# Patient Record
Sex: FEMALE | Race: WHITE | Employment: FULL TIME | URBAN - METROPOLITAN AREA
[De-identification: names, ages, dates, MRNs, and addresses within clinical notes are randomized per-mention and may not be internally consistent; named-entity substitution may affect disease eponyms.]

---

## 2018-04-12 ENCOUNTER — HOSPITAL ENCOUNTER (OUTPATIENT)
Dept: PREADMISSION TESTING | Facility: HOSPITAL | Age: 43
Discharge: HOME | End: 2018-04-12
Payer: COMMERCIAL

## 2018-04-12 DIAGNOSIS — M51.36 DDD (DEGENERATIVE DISC DISEASE), LUMBAR: Primary | ICD-10-CM

## 2018-04-18 ENCOUNTER — TELEPHONE (OUTPATIENT)
Dept: ORTHOPEDICS | Facility: CLINIC | Age: 43
End: 2018-04-18

## 2018-04-18 NOTE — TELEPHONE ENCOUNTER
Updated the patient that I am currently waiting on a phone call from [facility] nurse mary to return my call about setting up a peer to peer with [facility] physician and dr verdin. Will keep the patient updated with changes and cancel the sx for 4/24 since we do not have an auth at this time.   ----- Message from Davida Grullon sent at 4/13/2018  1:36 PM CDT -----  Contact: PATIENT  PLEASE CALL PATIENT ABOUT INSURANCE QUESTIONS

## 2018-11-01 ENCOUNTER — OFFICE VISIT (OUTPATIENT)
Dept: ORTHOPEDICS | Facility: CLINIC | Age: 43
End: 2018-11-01
Payer: COMMERCIAL

## 2018-11-01 VITALS
BODY MASS INDEX: 28.82 KG/M2 | WEIGHT: 173 LBS | HEART RATE: 114 BPM | HEIGHT: 65 IN | DIASTOLIC BLOOD PRESSURE: 88 MMHG | SYSTOLIC BLOOD PRESSURE: 135 MMHG

## 2018-11-01 DIAGNOSIS — M65.332 TRIGGER FINGER, LEFT MIDDLE FINGER: Primary | ICD-10-CM

## 2018-11-01 PROCEDURE — 73130 X-RAY EXAM OF HAND: CPT | Mod: LT,,, | Performed by: ORTHOPAEDIC SURGERY

## 2018-11-01 PROCEDURE — 99203 OFFICE O/P NEW LOW 30 MIN: CPT | Mod: 25,,, | Performed by: ORTHOPAEDIC SURGERY

## 2018-11-01 PROCEDURE — 20550 NJX 1 TENDON SHEATH/LIGAMENT: CPT | Mod: F2,,, | Performed by: ORTHOPAEDIC SURGERY

## 2018-11-01 RX ORDER — METHYLPREDNISOLONE ACETATE 40 MG/ML
40 INJECTION, SUSPENSION INTRA-ARTICULAR; INTRALESIONAL; INTRAMUSCULAR; SOFT TISSUE
Status: DISCONTINUED | OUTPATIENT
Start: 2018-11-01 | End: 2018-11-01 | Stop reason: HOSPADM

## 2018-11-01 RX ORDER — PRAVASTATIN SODIUM 20 MG/1
TABLET ORAL
Refills: 4 | COMMUNITY
Start: 2018-08-27

## 2018-11-01 RX ORDER — CITALOPRAM 40 MG/1
TABLET, FILM COATED ORAL
Refills: 5 | COMMUNITY
Start: 2018-09-25 | End: 2020-03-16 | Stop reason: SDUPTHER

## 2018-11-01 RX ADMIN — METHYLPREDNISOLONE ACETATE 40 MG: 40 INJECTION, SUSPENSION INTRA-ARTICULAR; INTRALESIONAL; INTRAMUSCULAR; SOFT TISSUE at 04:11

## 2018-11-01 NOTE — PROCEDURES
Tendon Sheath: L long MCP  Date/Time: 11/1/2018 4:10 PM  Performed by: Vidal Kenney MD  Authorized by: Vidal Kenney MD     Consent Done?: Yes (Verbal)  Timeout: prior to procedure the correct patient, procedure, and site was verified   Indications:  Pain  Site marked: the procedure site was marked    Timeout: prior to procedure the correct patient, procedure, and site was verified    Location:  Long finger  Site:  L long MCP  Prep: patient was prepped and draped in usual sterile fashion    Ultrasonic guidance for needle placement?: No    Needle size:  25 G  Medications:  40 mg methylPREDNISolone acetate 40 mg/mL  Patient tolerance:  Patient tolerated the procedure well with no immediate complications

## 2018-11-01 NOTE — PROGRESS NOTES
[facility]    Subjective:     Chief Complaint:   Chief Complaint   Patient presents with    Left Hand - Pain     Left hand pain off and on for months. States that her hand hurts all the time and states that she has a spot on her palm that sends a shooting pain.        Past Medical History:   Diagnosis Date    Anxiety     Back pain     DDD    Hypertension        Past Surgical History:   Procedure Laterality Date    ADENOIDECTOMY      APPENDECTOMY       SECTION      CHOLECYSTECTOMY      HYSTERECTOMY      INJECTION-STEROID-EPIDURAL-TRANSFORAMINAL Bilateral 2014    Performed by Steven Pradhan MD at [facility]    OOPHORECTOMY      Pt states that she only has one ovary, but does not remember which one.    TONSILLECTOMY         Current Outpatient Medications   Medication Sig    celecoxib (CELEBREX) 100 MG capsule Take 1 capsule (100 mg total) by mouth once daily.    citalopram (CELEXA) 40 MG tablet     fluticasone (FLONASE) 50 mcg/actuation nasal spray 1 spray by Each Nare route once daily. (Patient taking differently: 1 spray by Each Nare route daily as needed. )    omeprazole (PRILOSEC) 20 MG capsule Take 1 capsule (20 mg total) by mouth once daily.    pravastatin (PRAVACHOL) 20 MG tablet     urea (CARMOL) 40 % Crea Apply topically 2 (two) times daily. (Patient taking differently: Apply topically daily as needed. )    baclofen (LIORESAL) 20 MG tablet Take 1 tablet (20 mg total) by mouth every evening. Start w. Half a tab nightly for 1wk. If tolerated in to one full tab    escitalopram oxalate (LEXAPRO) 10 MG tablet Take 1 tablet (10 mg total) by mouth once daily.    hydrochlorothiazide (MICROZIDE) 12.5 mg capsule Take 1 capsule (12.5 mg total) by mouth once daily.     No current facility-administered medications for this visit.        Review of patient's allergies indicates:  No Known Allergies    Family History   Problem Relation Age of Onset    Hypertension Mother     Breast  cancer Mother 55    Hypertension Father     Heart disease Father     Hypertension Brother     Ovarian cancer Neg Hx     Colon cancer Neg Hx        Social History     Socioeconomic History    Marital status:      Spouse name: Not on file    Number of children: Not on file    Years of education: Not on file    Highest education level: Not on file   Social Needs    Financial resource strain: Not on file    Food insecurity - worry: Not on file    Food insecurity - inability: Not on file    Transportation needs - medical: Not on file    Transportation needs - non-medical: Not on file   Occupational History     Employer: [employer]   Tobacco Use    Smoking status: Former Smoker     Packs/day: 0.50     Years: 15.00     Pack years: 7.50     Last attempt to quit: 4/1/2003     Years since quitting: 15.5    Smokeless tobacco: Never Used   Substance and Sexual Activity    Alcohol use: Yes     Alcohol/week: 7.0 oz     Types: 14 Standard drinks or equivalent per week     Comment: socially    Drug use: No    Sexual activity: Yes     Partners: Male     Birth control/protection: Surgical   Other Topics Concern    Not on file   Social History Narrative    Not on file       History of present illness: Patient returns today for the left hand. She is having catching in the left long finger. It's painful. It's been going on for about a month.      Review of Systems:    Constitution: Negative for chills, fever, and sweats.  Negative for unexplained weight loss.    HENT:  Negative for headaches and blurry vision.    Cardiovascular:Negative for chest pain or irregular heart beat. Negative for hypertension.    Respiratory:  Negative for cough and shortness of breath.    Gastrointestinal: Negative for abdominal pain, heartburn, melena, nausea, and vomitting.    Genitourinary:  Negative bladder incontinence and dysuria.    Musculoskeletal:  See HPI for details.     Neurological: Negative for  numbness.    Psychiatric/Behavioral: Negative for depression.  The patient is not nervous/anxious.      Endocrine: Negative for polyuria    Hematologic/Lymphatic: Negative for bleeding problem.  Does not bruise/bleed easily.    Skin: Negative for poor would healing and rash    Objective:      Physical Examination:    Vital Signs:    Vitals:    11/01/18 1558   BP: 135/88   Pulse: (!) 114       Body mass index is 28.79 kg/m².    This a well-developed, well nourished patient in no acute distress.  They are alert and oriented and cooperative to examination.        Patient is full range of motion of the left hand. She has triggering of the long finger. Tinel's is negative. Intact sensation. Full range of motion of the hand  Pertinent New Results:    XRAY Report / Interpretation:   AP and lateral and oblique of the left hand done in the office today demonstrate normal bony anatomy no fractures or subluxations    Assessment/Plan:      Trigger finger left long. I injected the left long with Depo-Medrol and lidocaine. She will follow-up in one month      This note was created using Dragon voice recognition software that occasionally misinterpreted phrases or words.

## 2020-03-16 RX ORDER — CITALOPRAM 40 MG/1
40 TABLET, FILM COATED ORAL DAILY
Qty: 90 TABLET | Refills: 1 | Status: SHIPPED | OUTPATIENT
Start: 2020-03-16

## 2020-03-16 NOTE — TELEPHONE ENCOUNTER
----- Message from Gaby Dixon sent at 3/16/2020  3:19 PM CDT -----  Refill Celexa to [facility] p # [phone]  cb # [phone]

## 2021-05-10 ENCOUNTER — PATIENT MESSAGE (OUTPATIENT)
Dept: RESEARCH | Facility: HOSPITAL | Age: 46
End: 2021-05-10